# Patient Record
Sex: FEMALE
[De-identification: names, ages, dates, MRNs, and addresses within clinical notes are randomized per-mention and may not be internally consistent; named-entity substitution may affect disease eponyms.]

---

## 2019-02-12 ENCOUNTER — HOSPITAL ENCOUNTER (EMERGENCY)
Dept: HOSPITAL 80 - FED | Age: 34
Discharge: HOME | End: 2019-02-12
Payer: MEDICAID

## 2019-02-12 VITALS — SYSTOLIC BLOOD PRESSURE: 111 MMHG | DIASTOLIC BLOOD PRESSURE: 74 MMHG

## 2019-02-12 DIAGNOSIS — Z88.0: ICD-10-CM

## 2019-02-12 DIAGNOSIS — F32.9: ICD-10-CM

## 2019-02-12 DIAGNOSIS — N83.291: ICD-10-CM

## 2019-02-12 DIAGNOSIS — M51.16: Primary | ICD-10-CM

## 2019-02-12 NOTE — EDPHY
H & P


Stated Complaint: Low back and left hip pain since chiropacter care.


Time Seen by Provider: 02/12/19 16:07


HPI/ROS: 





CHIEF COMPLAINT:  Left leg numbness





HISTORY OF PRESENT ILLNESS:  33-year-old female presents with left leg 

numbness.  Onset of low back pain few weeks ago.  She saw a chiropractor 

multiple times and after 1 of the sessions, she developed tingling down the 

left leg.  The tingling has progressed and now her leg feels numb.  Associated 

with left hip and buttock pain.  The pain is moderate and somewhat relieved 

with ibuprofen.  MRI of the lumbar spine and left hip ordered by her PCP, but 

has not yet obtain MRI.  Requests MRI to be done today.





REVIEW OF SYSTEMS:  complete 10 point ROS reviewed and is negative except for 

the noted elements in the HPI








- Personal History


Current Tetanus Diphtheria and Acellular Pertussis (TDAP): Yes


Tetanus Vaccine Date: 2006





- Medical/Surgical History


Hx Asthma: No


Hx Chronic Respiratory Disease: No


Hx Diabetes: No


Hx Cardiac Disease: No


Hx Renal Disease: No


Hx Cirrhosis: No


Hx Alcoholism: No


Hx HIV/AIDS: No


Hx Splenectomy or Spleen Trauma: No


Other PMH: Spinal injury. Concussion. Depression.





- Social History


Smoking Status: Never smoked





- Physical Exam


Exam: 





General Appearance:  Alert, pleasant


Eyes:  Pupils equal and round, no conjunctival pallor or injection


ENT, Mouth:  Mucous membranes moist


Neck:  Normal inspection


Respiratory:  Lungs are clear to auscultation


Cardiovascular:  Regular rate and rhythm


Gastrointestinal:  Abdomen is soft and nontender


Neurological:  A&O, motor 5/5 including dorsiflexion of the ankle and 1st toe, 

patellar DTRs 2+ bilaterally, decreased sensation to light touch on the medial 

aspect of the left lower leg


Skin:  Warm and dry


Extremities:  Negative straight leg raise, normal inspection


Vascular: 2+ pedal pulses


Psychiatric:  Mood and affect normal





Constitutional: 


 Initial Vital Signs











Temperature (C)  36.7 C   02/12/19 13:56


 


Heart Rate  84   02/12/19 13:56


 


Respiratory Rate  18   02/12/19 13:56


 


Blood Pressure  114/86 H  02/12/19 13:56


 


O2 Sat (%)  98   02/12/19 13:56








 











O2 Delivery Mode               Room Air














Allergies/Adverse Reactions: 


 





Penicillins Allergy (Severe, Verified 02/16/12 14:41)


 Anaphylaxis








Home Medications: 














 Medication  Instructions  Recorded


 


methylPREDNISolone [Medrol Dose 1 each PO AD #1 ea 02/12/19





Venkatesh]  














Medical Decision Making





- Diagnostics


Imaging Results: 


Lumbar Spine MRI  02/12/19 16:44


Impression:


 


1. There is mild degenerative disk disease at L1-L2 with some desiccation and 

broad-based circumferential disk bulging, but no significant central canal or 

neural foraminal stenosis to explain the patient's left radicular symptoms.


2. There is an incompletely-imaged 3.8 x 3.7 x 4.1 cm right adnexal cystic 

structure. Dedicated pelvic sonography is recommended for further assessment. 


 


Findings were discussed with OSMEL MEDLEY MD at 18:44, on 2/12/2019.


 


MRI left hip: NAD





Imaging: Discussed imaging studies w/ On call Radiologist


ED Course/Re-evaluation: 





This pt presents with sx c/w left sided sciatica, worsening and now with 

neurologic sx (numbness).  Difficulty obtaining out pt MRI, which prompted her 

visit today.  At her request, MRI lumbar spine and left hip obtained.  MRI 

reveals a mild L1-2 bulging disc, though no evidence for acute disc herniation.

  Will place pt on a Medrol dose pack.  f/u with Dr. Granado in the office.





Differential Diagnosis: 





includes though not limited to acute disc herniation, sciatica, spinal infection

, hip infection, DJD








Departure





- Departure


Disposition: Home, Routine, Self-Care


Clinical Impression: 


 Paresthesia of left leg





Condition: Good


Instructions:  Paresthesia (ED)


Additional Instructions: 


The MRI of your left hip and pelvis reveals normal-appearing bones.  The MRI of 

your lumbar spine shows a small disc bulging at L1/2.


You also have a right ovarian cyst, 4 cm and diameter.  You will need a follow-

up pelvic ultrasound.


Take the Medrol dose pack as prescribed.


Return for worsening symptoms or any concerns.





Referrals: 


Loy-Yamila Beaver MD [Primary Care Provider] - As per Instructions (

Call to make an appointment.)


Prescriptions: 


methylPREDNISolone [Medrol Dose Venkatesh] 1 each PO AD #1 ea

## 2019-04-05 ENCOUNTER — HOSPITAL ENCOUNTER (EMERGENCY)
Dept: HOSPITAL 80 - FED | Age: 34
Discharge: HOME | End: 2019-04-05
Payer: MEDICAID

## 2019-04-05 VITALS — SYSTOLIC BLOOD PRESSURE: 141 MMHG | DIASTOLIC BLOOD PRESSURE: 99 MMHG

## 2019-04-05 DIAGNOSIS — F41.0: Primary | ICD-10-CM

## 2019-04-05 DIAGNOSIS — G89.29: ICD-10-CM

## 2019-04-05 DIAGNOSIS — Z88.0: ICD-10-CM

## 2019-04-05 DIAGNOSIS — R51: ICD-10-CM

## 2019-04-05 DIAGNOSIS — F32.9: ICD-10-CM

## 2019-04-05 DIAGNOSIS — M54.5: ICD-10-CM

## 2019-04-05 DIAGNOSIS — M54.2: ICD-10-CM

## 2019-04-05 NOTE — EDPHY
General


Time Seen by Provider: 04/05/19 19:03


Narrative: 





CLINICAL IMPRESSION:  Chronic intermittent anxiety, chronic back pain


_________________


ASSESSMENT/PLAN:


33-year-old female presents to the emergency department by EMS complaining of 

having a panic attack.  Patient has a very long history, please see HPI for 

full details.  She has been struggling with chronic intermittent headaches as 

well as chronic back pain following several concussions dating back to 2009 as 

well as "botched chiropractic adjustments" as early as 2016. She has seen 

numerous medical specialist for this, is currently established with Neurology, 

[facility], primary care, and Psychiatry.  She started an 

antidepressant 3 days ago and feels it is making her anxiety and depression 

worse.  She attributes her depression to her chronic pain and headaches.  She 

reportedly had a normal MRI scan of her brain just last week but has a order 

for a repeat MRI with and without contrast.  She has no history of new trauma 

or fall, has no focal neurological deficits on exam, no complaints of groin 

anesthesia, bowel or bladder incontinence, urinary retention, gait intolerance.

  She does not feel suicidal or homicidal.  She does not wish to talk to our 

[facility] provider.  She is hesitant to take benzodiazepines because she feels they 

will make her anxiety worse.  I do not believe this patient requires an 

emergent CT brain or emergent MRI of the thoracic spine.  I have offered her a 

prescription for a small amount of Ativan to use at home as she still needs to 

get back to her vehicle which is [place] [facility].  I encouraged her to 

contact her primary psychiatrist to discuss her depression management.  I have 

referred her to Neurosurgery.  Warning signs return to ED sooner were outlined 

in person and discharge papers.  I spent approximately 35 min in discussion 

with the patient at bedside. 


_________________


DIFFERENTIAL DX:


 Differential includes but not limited to, acute/chronic psychosis, severe 

depression, suicidal or homicidal ideations, grave disability, failure to thrive

, medication noncompliance, medication side effect, alcohol intoxication and 

illicit drug use, metabolic disturbance, electrolyte imbalance, chronic pain


_________________


CHIEF COMPLAINT:  Panic


_________________


HPI:


 33-year-old female presents to the emergency department by EMS after she 

allegedly call the ambulance feeling panicked while driving home tonight.  

Patient reports she has struggled with depression ever since experiencing 

several past concussions dating back to 2009. Her most recent concussion was in 

July 2018.  She has also struggled with chronic neck and back pain secondary to 

"a botched chiropractic adjustment".  Patient has seen numerous specialists and 

is currently established with a neurologist for chronic headaches, [facility] for chronic back pain, psychiatry for depression, Orthopedics for 

leg pain, and also has a variety of holistic practitioners including acupuncture

, massage therapy, and physical therapy.  She reports to me that she had a 

normal MRI scan of her brain recently given persistent confusion related to 

concussions.  She reportedly has another MRI scheduled.  She has also had MRI 

of the lumbar spine as recently in February this year.  She denies any recent 

trauma or injury.  She started a new antidepressant 3 days ago by Dr. Aggarwal 

and feels this is making her anxiety worse.  She has decided to stop taking 

this.  She feels that benzodiazepines also make her anxiety worse although she 

has only tried Valium in the past.  She attempted to contact her psychiatrist 

tonight however he is in [place].  She reports no severe or sudden onset 

thunderclap headache tonight, no dizziness, vertigo, acute vision or hearing 

changes.  She denies saddle anesthesia, bowel or bladder incontinence, urinary 

retention, paresthesias to arms or hands, and no fevers or chills.  She does 

not abuse IV drugs.  She feels depressed because she can no longer participate 

and in the of the activity she used to perform including [employer], rock 

climbing and hiking.  She is currently living with her parents because she 

cannot "take care of herself".  She is not feeling suicidal or homicidal and 

does not wish to speak to anyone tonight about her depression.  She is hesitant 

to take benzodiazepines and is more concerned that there is something "

seriously wrong with a potential burst disc in my back".


_________________


PAST MEDICAL HISTORY: 


Depression, anxiety, chronic back pain, chronic headaches See triage summary 

and nurse notes for addition applicable history 





Pertinent Past Surgical History:  Ortho surgery





Family History:  None reported





Social History:  Nonsmoker does not drink alcohol or use illicit drugs


_________________


REVIEW OF SYSTEMS:


A full 10 point review of systems was negative except for those mentioned in 

HPI. 


_________________


PHYSICAL EXAM:


General Appearance:  Alert, oriented, anxious, very talkative, pressured speech 

pattern, well hydrated, non-toxic appearing, VSS, no hypoxia.


HEENT: TMs are clear bilaterally no perforation or FB, no injection, no 

evidence of serous or mucopurulent otitis. Oropharynx clear is no erythema or 

exudates, no tonsillar hypertrophy or asymmetry.  Dentition without abnormality.


Eyes: PERRLA, no acute vision change, nystagmus, swelling, discharge, pain or 

photosensitivity. Conjunctiva pink, no pallor or injection


Neck: Supple, nontender, no lymphadenopathy, no midline pain, FROM, no 

meningismus.


Respiratory:  There are no retractions, lungs are clear to auscultation.


Cardiac:  Regular rate and rhythm, no murmurs or gallops.


Musculoskeletal.  Full range of motion of both legs.  No lower extremity 

paresthesias.  No reported saddle anesthesia.  Patellar DTRs 2+ bilaterally


_________________


MEDICAL DECISION MAKING:


Patient was seen independently. Secondary supervising physician at time of 

evaluation was:  Dr. McCollester.


Diagnosis: Acute stress reaction, depression, chronic neck and back pain. New, 

requires workup


Summary: See Assessment and Plan for summary of ED visit 








Patient Progress:  Stable for discharge. 





- History


Smoking Status: Never smoked





- Objective


Vital Signs: 


 Initial Vital Signs











Temperature (C)  36.5 C   04/05/19 19:07


 


Heart Rate  70   04/05/19 19:07


 


Respiratory Rate  20   04/05/19 19:07


 


Blood Pressure  141/97 H  04/05/19 19:07


 


O2 Sat (%)  97   04/05/19 19:07








 











O2 Delivery Mode               Room Air














Allergies/Adverse Reactions: 


 





Penicillins Allergy (Severe, Verified 04/05/19 19:07)


 Anaphylaxis








Home Medications: 














 Medication  Instructions  Recorded


 


methylPREDNISolone [Medrol Dose 1 each PO AD #1 ea 02/12/19





Venkatesh]  


 


LORazepam [Ativan] 1 tab PO BID PRN #6 tab 04/05/19











Medications Given: 


 








Discontinued Medications





Lorazepam (Ativan)  1 mg PO EDNOW ONE


   Stop: 04/05/19 20:44


   Last Admin: 04/05/19 20:46 Dose:  1 mg








Departure





- Departure


Disposition: Home, Routine, Self-Care


Clinical Impression: 


 Panic attack as reaction to stress





Condition: Good


Instructions:  Panic Disorder (ED)


Additional Instructions: 


DISCHARGE INSTRUCTIONS FROM YOUR DOCTOR 


Thank you for visiting our emergency department today. You were treated by a 

physician assistant today and your case was reviewed with our ED Attending 

physician.  Please keep in mind that discharge from the emergency department 

does not mean that there is nothing wrong - it simply means that we have not 

identified an emergency condition that requires further evaluation or treatment 

in the hospital. You should always plan to follow up with primary care for re-

evaluation of your condition in the next 2-3 days. If you have been referred to 

a specialist, please call as soon as possible (today or tomorrow) to schedule 

your follow up appointment at the appropriate time. 





 WE DO NOT PERFORM ROUTINE MRI SCANS FOR CHRONIC BACK PAIN IN THE EMERGENCY 

DEPARTMENT. A REFERRAL TO NEUROSURGERY WAS PROVIDED.  PLEASE CONTACT YOUR 

NEUROLOGIST AND [facility] TO SEE IF YOU CAN MOVE UP THE APPOINTMENT 

FOR YOUR MRI OF HER BRAIN.  A SMALL AMOUNT OF ATIVAN WAS GIVEN TO HELP WITH 

ANXIETY AND TO HELP YOU SLEEP UNTIL YOU CAN DISCUSS YOUR DEPRESSION WITH YOUR 

PSYCHIATRIST WHEN HE RETURNS.  I OFFERED EVALUATION WITH OUR PSYCH TRIAGE TEAM 

TODAY BUT YOU HAVE DECLINED.  PLEASE RETURN TO THE EMERGENCY DEPARTMENT 

IMMEDIATELY IF YOU FEEL UNSAFE, SUICIDAL OR HOMICIDAL, HAVE NEW OR WORSENING 

BACK PAIN WITH HIGH FEVERS, BOWEL OR BLADDER INCONTINENCE, URINARY RETENTION, 

NUMBNESS TO HER GROIN, OR ANY OTHER CONCERNS.





People present with illnesses and injuries in different ways, and it is always 

possible that we have missed something. You may always return for re-evaluation 

if symptoms worsen or if they are not improving or if you develop new/different 

symptoms. 


Again, thank you for choosing our emergency department. We hope that you feel 

better.


Referrals: 


Patient,NotPresent [Unknown] - As per Instructions


Miri Rose PAC [Physician Assistant] - 5-7 days, call for appt.


Prescriptions: 


LORazepam [Ativan] 1 tab PO BID PRN #6 tab


 PRN Reason: Anxiety